# Patient Record
Sex: MALE | Race: WHITE
[De-identification: names, ages, dates, MRNs, and addresses within clinical notes are randomized per-mention and may not be internally consistent; named-entity substitution may affect disease eponyms.]

---

## 2018-06-26 ENCOUNTER — HOSPITAL ENCOUNTER (EMERGENCY)
Dept: HOSPITAL 80 - FED | Age: 23
LOS: 1 days | Discharge: HOME | End: 2018-06-27
Payer: COMMERCIAL

## 2018-06-26 VITALS — DIASTOLIC BLOOD PRESSURE: 89 MMHG | SYSTOLIC BLOOD PRESSURE: 132 MMHG

## 2018-06-26 DIAGNOSIS — K60.2: Primary | ICD-10-CM

## 2018-06-26 DIAGNOSIS — Z87.891: ICD-10-CM

## 2018-06-26 NOTE — EDPHY
H & P


Stated Complaint: bright red blood in stool


Time Seen by Provider: 06/26/18 23:44


HPI/ROS: 





Chief Complaint:  Rectal bleeding





HPI:  23-year-old male had a bowel movement earlier this evening and noticed 

some blood in his stool.  Since that time he has wiped with toilet paper noted 

some blood spotting on the toilet paper.  He has never had a history of similar 

in the past.  Does state that he has been having some quite firm stools and 

straining recently.  No rectal pain or irritation.  No other medical problems.  

No is complaining some mild left-sided abdominal pain.  No nausea or vomiting.  

No melena.  No fevers or chills.





ROS:  10 point Review of Systems is negative except as noted in the HPI.





PMH:  Seasonal allergies





Social History: No smoking, occasional alcohol, occasional marijuana





Family History: non-contributory





Physical Exam:


Gen: Awake, Alert, No Distress


HEENT:  


     Nose: no rhinorrhea


     Eyes: PERRLA, EOMI


     Mouth: Moist mucosa 


Neck: Supple, no JVD


Abd: Soft, non-tender, no guarding


Rectal:  Patient has no obvious external hemorrhoids.  There is a fissure in 

the 6 o'clock position with a scant amount of bleeding.


Back: no CVA tenderness, no midline tenderness 


Ext: no edema, non-tender


Skin: no rash


Neuro: CN II-XII intact, Sensation grossly intact, Strength 5/5 in bilateral 

upper and lower extremities








- Personal History


Current Tetanus/Diphtheria Vaccine: Yes





- Medical/Surgical History


Hx Asthma: No


Hx Chronic Respiratory Disease: No


Hx Diabetes: No


Hx Cardiac Disease: No


Hx Renal Disease: No


Hx Cirrhosis: No


Hx Alcoholism: No


Hx HIV/AIDS: No


Hx Splenectomy or Spleen Trauma: No


Other PMH: denies





- Social History


Smoking Status: Former smoker


Constitutional: 





 Initial Vital Signs











Temperature (C)  36.5 C   06/26/18 23:15


 


Heart Rate  77   06/26/18 23:15


 


Respiratory Rate  18   06/26/18 23:15


 


Blood Pressure  132/89 H  06/26/18 23:15


 


O2 Sat (%)  96   06/26/18 23:15








 











O2 Delivery Mode               Room Air














Allergies/Adverse Reactions: 


 





No Known Allergies Allergy (Unverified 06/26/18 23:17)


 








Home Medications: 














 Medication  Instructions  Recorded


 


Nasacort  06/26/18


 


ZYRTEC  06/26/18














Medical Decision Making


ED Course/Re-evaluation: 





23-year-old male with a rectal fissure.  Otherwise normal exam.  He is 

otherwise well-appearing.  I have recommended he make sure he drink plenty of 

fluids.  Increase fiber in his diet.  He will follow up with [facility] in 

about a week for recheck.





Departure





- Departure


Disposition: Home, Routine, Self-Care


Clinical Impression: 


 Anal fissure





Condition: Good


Instructions:  Anal Fissure (ED)


Additional Instructions: 


Make sure to drink at least 8, 8 oz glasses of water a day, more when the 

weather is hot.


Increase fiber in your diet including green leafy vegetables.  You may also 

take a fiber supplement such as FiberCon over-the-counter.


Follow up at [facility] in about a week for recheck.


Referrals: 


BHARGAV MARTINEZ,. [Clinic] - As per Instructions